# Patient Record
Sex: FEMALE | Race: WHITE | NOT HISPANIC OR LATINO | URBAN - METROPOLITAN AREA
[De-identification: names, ages, dates, MRNs, and addresses within clinical notes are randomized per-mention and may not be internally consistent; named-entity substitution may affect disease eponyms.]

---

## 2018-07-15 ENCOUNTER — OFFICE VISIT (OUTPATIENT)
Dept: URGENT CARE | Facility: CLINIC | Age: 38
End: 2018-07-15
Payer: COMMERCIAL

## 2018-07-15 VITALS
DIASTOLIC BLOOD PRESSURE: 68 MMHG | TEMPERATURE: 97.3 F | HEIGHT: 65 IN | BODY MASS INDEX: 23.32 KG/M2 | WEIGHT: 140 LBS | SYSTOLIC BLOOD PRESSURE: 104 MMHG | RESPIRATION RATE: 16 BRPM | HEART RATE: 81 BPM | OXYGEN SATURATION: 99 %

## 2018-07-15 DIAGNOSIS — M54.50 LUMBAR BACK PAIN: Primary | ICD-10-CM

## 2018-07-15 PROCEDURE — 99283 EMERGENCY DEPT VISIT LOW MDM: CPT | Performed by: PHYSICIAN ASSISTANT

## 2018-07-15 PROCEDURE — G0382 LEV 3 HOSP TYPE B ED VISIT: HCPCS | Performed by: PHYSICIAN ASSISTANT

## 2018-07-15 RX ORDER — NORETHINDRONE ACETATE AND ETHINYL ESTRADIOL 1; .02 MG/1; MG/1
1 TABLET ORAL DAILY
COMMUNITY

## 2018-07-15 RX ORDER — CYCLOBENZAPRINE HCL 10 MG
10 TABLET ORAL 3 TIMES DAILY PRN
Qty: 21 TABLET | Refills: 0 | Status: SHIPPED | OUTPATIENT
Start: 2018-07-15

## 2018-07-15 NOTE — PROGRESS NOTES
NAME: Tracie Hull is a 40 y o  female  : 1980    MRN: [id number]      Assessment and Plan   Lumbar back pain [M54 5]  1  Lumbar back pain  cyclobenzaprine (FLEXERIL) 10 mg tablet      discussed the clinical history with patient-she understands she is to follow up with her PCP for further evaluation and testing  And she knows to go to the ER if she develops fevers or chills      Patient Instructions   Patient Instructions   Take 800 mg of ibuprofen every 8 hours with food  Take Flexeril as needed  If these medications do not improve her back pain, follow-up with her PCP as he may need further testing  If symptoms worsen, if develops fever, chills go the ER    Proceed to ER if symptoms worsen  History of Present Illness     Patient presents complaining of low back pain x1 day  She states is located in the middle of her low back and denies any inciting event or injury  Patient reports that her boyfriend has the exact same symptoms in the exact same spot and she became worried when her boyfriend's son said that his back was hurting today to  Patient denies any radiation of pain, numbness or tingling down the legs, bowel or bladder incontinence  She took 2 ibuprofen this morning and states that it did not help  She has not applied ice or heat  She really denies fever chills but says she does not feel herself  She says the pain is there all the time and her only relief is lying down  She denies history of back injuries or disc disease  She reports she was at the [facility] weeks ago on vacation  She reports concurrent episodes of diarrhea 3 times a day for the past few days  She states that the diarrhea is nonbloody and does not have mucus in it  Her 2 daughters also have similar symptoms with diarrhea but denies back pain  She also reports that she has a wild duck at home as a pet and is unsure if that has anything to do it  She denies her boyfriend having any symptoms of diarrhea          Review of Systems   Review of Systems   Constitutional: Negative for chills and fever  HENT: Negative for congestion, rhinorrhea, sinus pain, sinus pressure and sore throat  Respiratory: Negative for chest tightness, shortness of breath and wheezing  Cardiovascular: Negative for chest pain  Gastrointestinal: Positive for diarrhea  Negative for abdominal distention, abdominal pain, blood in stool, constipation, nausea and vomiting  Genitourinary: Negative for dysuria, flank pain, frequency, hematuria and urgency  Musculoskeletal: Positive for back pain and gait problem  Neurological: Negative for dizziness, light-headedness and headaches  Current Medications       Current Outpatient Prescriptions:     norethindrone-ethinyl estradiol (MICROGESTIN 1/20) 1-20 MG-MCG per tablet, Take 1 tablet by mouth daily, Disp: , Rfl:     cyclobenzaprine (FLEXERIL) 10 mg tablet, Take 1 tablet (10 mg total) by mouth 3 (three) times a day as needed for muscle spasms, Disp: 21 tablet, Rfl: 0    Current Allergies     Allergies as of 07/15/2018    (No Known Allergies)              No past medical history on file  No past surgical history on file  No family history on file  Medications have been verified  The following portions of the patient's history were reviewed and updated as appropriate: allergies, current medications, past family history, past medical history, past social history, past surgical history and problem list     Objective   /68   Pulse 81   Temp (!) 97 3 °F (36 3 °C)   Resp 16   Ht 5' 5" (1 651 m)   Wt 63 5 kg (140 lb)   SpO2 99%   BMI 23 30 kg/m²      Physical Exam     Physical Exam   Constitutional: She appears well-developed and well-nourished  No distress  Musculoskeletal:   Lumbar spine: NTTP over midline or PVMs  Area without ecchymosis, edema, erythema, warmth, abrasion  Full range of motion with flexion extension but with pain especially on extension    Patient having difficulty standing up straight and reports the pain is [place] her she moves around    Antalgic gait

## 2018-07-15 NOTE — LETTER
July 15, 2018     Patient: Amparo Born   YOB: 1980   Date of Visit: 7/15/2018       To Whom it May Concern:    Amparo Born was seen in my clinic on 7/15/2018  If you have any questions or concerns, please don't hesitate to call           Sincerely,          Kali Bliss PA-C        CC: No Recipients

## 2018-07-15 NOTE — PATIENT INSTRUCTIONS
Take 800 mg of ibuprofen every 8 hours with food  Take Flexeril as needed  If these medications do not improve her back pain, follow-up with her PCP as he may need further testing  If symptoms worsen, if develops fever, chills go the ER

## 2021-02-24 ENCOUNTER — NURSE TRIAGE (OUTPATIENT)
Dept: OTHER | Facility: OTHER | Age: 41
End: 2021-02-24

## 2021-02-24 DIAGNOSIS — Z20.828 EXPOSURE TO SARS-ASSOCIATED CORONAVIRUS: ICD-10-CM

## 2021-02-24 DIAGNOSIS — Z20.828 EXPOSURE TO SARS-ASSOCIATED CORONAVIRUS: Primary | ICD-10-CM

## 2021-02-24 PROCEDURE — U0003 INFECTIOUS AGENT DETECTION BY NUCLEIC ACID (DNA OR RNA); SEVERE ACUTE RESPIRATORY SYNDROME CORONAVIRUS 2 (SARS-COV-2) (CORONAVIRUS DISEASE [COVID-19]), AMPLIFIED PROBE TECHNIQUE, MAKING USE OF HIGH THROUGHPUT TECHNOLOGIES AS DESCRIBED BY CMS-2020-01-R: HCPCS | Performed by: FAMILY MEDICINE

## 2021-02-24 PROCEDURE — U0005 INFEC AGEN DETEC AMPLI PROBE: HCPCS | Performed by: FAMILY MEDICINE

## 2021-02-24 NOTE — TELEPHONE ENCOUNTER
Regarding: Covid-19 - Symptomatic (Nauseous)  ----- Message from Rea De Leon sent at 2/24/2021 10:10 AM EST -----  "I had a really bad headache and for the last two days I've been nauseous, have no energy and body aches "

## 2021-02-24 NOTE — TELEPHONE ENCOUNTER
Reason for Disposition   [1] COVID-19 infection suspected by caller or triager AND [2] mild symptoms (cough, fever, or others) AND [9] no complications or SOB    Protocols used: CORONAVIRUS (COVID-19) [facility]AGNOSED OR [facility]USPECT[facility]

## 2021-02-24 NOTE — TELEPHONE ENCOUNTER
1  Were you within 6 feet or less, for up to 15 minutes or more with a person that has a confirmed COVID-19 test? "Not that I know of "  2  What was the date of your exposure? Symptoms started on 02/17/2021  3  Are you experiencing any symptoms attributed to the virus?  (Assess for SOB, cough, fever, difficulty breathing) Infrequent, dry cough, had chills last night but no fever, HA, runny nose, nasal congestion, post nasal drip, nausea, diarrhea, fatigue, and body aches  4  HIGH RISK: Do you have any history heart or lung conditions, weakened immune system, diabetes, Asthma, CHF, HIV, COPD, Chemo, renal failure, sickle cell, etc? Healthy  5  PREGNANCY: Are you pregnant or did you recently give birth? Denies, LMP 2 weeks ago

## 2021-02-25 LAB — SARS-COV-2 RNA RESP QL NAA+PROBE: NEGATIVE

## 2021-10-20 ENCOUNTER — OFFICE VISIT (OUTPATIENT)
Dept: URGENT CARE | Facility: CLINIC | Age: 41
End: 2021-10-20
Payer: COMMERCIAL

## 2021-10-20 VITALS
HEIGHT: 65 IN | RESPIRATION RATE: 16 BRPM | SYSTOLIC BLOOD PRESSURE: 104 MMHG | HEART RATE: 71 BPM | DIASTOLIC BLOOD PRESSURE: 62 MMHG | TEMPERATURE: 97.5 F | OXYGEN SATURATION: 99 % | WEIGHT: 145 LBS | BODY MASS INDEX: 24.16 KG/M2

## 2021-10-20 DIAGNOSIS — S09.301A INJURY OF TYMPANIC MEMBRANE OF RIGHT EAR, INITIAL ENCOUNTER: Primary | ICD-10-CM

## 2021-10-20 DIAGNOSIS — H61.22 IMPACTED CERUMEN OF LEFT EAR: ICD-10-CM

## 2021-10-20 PROCEDURE — G0382 LEV 3 HOSP TYPE B ED VISIT: HCPCS | Performed by: PHYSICIAN ASSISTANT

## 2021-10-20 PROCEDURE — 99283 EMERGENCY DEPT VISIT LOW MDM: CPT | Performed by: PHYSICIAN ASSISTANT

## 2021-10-20 PROCEDURE — 69209 REMOVE IMPACTED EAR WAX UNI: CPT | Performed by: PHYSICIAN ASSISTANT

## 2021-10-20 RX ORDER — BUSPIRONE HYDROCHLORIDE 10 MG/1
10 TABLET ORAL 2 TIMES DAILY
COMMUNITY
Start: 2021-06-10

## 2021-10-20 RX ORDER — NORETHINDRONE ACETATE AND ETHINYL ESTRADIOL 1; .02 MG/1; MG/1
1 TABLET ORAL
COMMUNITY

## 2021-10-20 RX ORDER — OFLOXACIN 3 MG/ML
10 SOLUTION AURICULAR (OTIC) DAILY
Qty: 5 ML | Refills: 0 | Status: SHIPPED | OUTPATIENT
Start: 2021-10-20

## 2021-10-20 RX ORDER — FLUOXETINE HYDROCHLORIDE 20 MG/1
CAPSULE ORAL
COMMUNITY
Start: 2021-07-27